# Patient Record
Sex: MALE | Race: WHITE | Employment: UNEMPLOYED | URBAN - METROPOLITAN AREA
[De-identification: names, ages, dates, MRNs, and addresses within clinical notes are randomized per-mention and may not be internally consistent; named-entity substitution may affect disease eponyms.]

---
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
## 2025-06-29 ENCOUNTER — HOSPITAL ENCOUNTER (OUTPATIENT)
Age: 20
Discharge: HOME | End: 2025-06-29
Payer: COMMERCIAL

## 2025-06-29 VITALS
OXYGEN SATURATION: 99 % | SYSTOLIC BLOOD PRESSURE: 146 MMHG | HEART RATE: 89 BPM | DIASTOLIC BLOOD PRESSURE: 80 MMHG | TEMPERATURE: 99 F | RESPIRATION RATE: 22 BRPM

## 2025-06-29 DIAGNOSIS — H60.313 ACUTE DIFFUSE OTITIS EXTERNA OF BOTH EARS: Primary | ICD-10-CM

## 2025-06-29 RX ORDER — ACETIC ACID 20.65 MG/ML
4 SOLUTION AURICULAR (OTIC) 3 TIMES DAILY
Qty: 15 ML | Refills: 0 | Status: SHIPPED | OUTPATIENT
Start: 2025-06-29

## 2025-06-29 RX ORDER — CIPROFLOXACIN AND DEXAMETHASONE 3; 1 MG/ML; MG/ML
4 SUSPENSION/ DROPS AURICULAR (OTIC) 2 TIMES DAILY
Qty: 7.5 ML | Refills: 0 | Status: SHIPPED | OUTPATIENT
Start: 2025-06-29

## 2025-06-29 RX ORDER — IBUPROFEN 600 MG/1
600 TABLET, FILM COATED ORAL ONCE
Status: COMPLETED | OUTPATIENT
Start: 2025-06-29 | End: 2025-06-29

## 2025-06-29 NOTE — ED PROVIDER NOTES
Patient Seen in: [facility]        History  Chief Complaint   Patient presents with    Ear Pain     Stated Complaint: Ear Pain    Subjective:   HPI          .JBEA      Objective:     Past Medical History:    Back pain    Depression    Gender dysphoria in adult              Past Surgical History:   Procedure Laterality Date    Mastectomy Bilateral                 No pertinent social history.            Review of Systems   All other systems reviewed and are negative.      Positive for stated complaint: Ear Pain  Other systems are as noted in HPI.  Constitutional and vital signs reviewed.      All other systems reviewed and negative except as noted above.                  Physical Exam    ED Triage Vitals [06/29/25 0824]   /80   Pulse 89   Resp 22   Temp 98.7 °F (37.1 °C)   Temp src Oral   SpO2 99 %   O2 Device None (Room air)       Current Vitals:   Vital Signs  BP: 146/80  Pulse: 89  Resp: 22  Temp: 98.7 °F (37.1 °C)  Temp src: Oral    Oxygen Therapy  SpO2: 99 %  O2 Device: None (Room air)            Physical Exam  Vitals and nursing note reviewed.   Constitutional:       General: He is not in acute distress.     Appearance: Normal appearance. He is normal weight. He is not ill-appearing, toxic-appearing or diaphoretic.   HENT:      Head: Normocephalic and atraumatic.      Right Ear: Tympanic membrane normal.      Left Ear: Tympanic membrane normal.      Ears:      Comments: Diffuse soft tissue swelling and erythema throughout bilateral ear canals with associated tragal tenderness      Nose: Congestion present. No rhinorrhea.      Mouth/Throat:      Mouth: Mucous membranes are moist.      Pharynx: Oropharynx is clear. No oropharyngeal exudate or posterior oropharyngeal erythema.   Eyes:      Extraocular Movements: Extraocular movements intact.      Conjunctiva/sclera: Conjunctivae normal.      Pupils: Pupils are equal, round, and reactive to light.   Cardiovascular:      Rate and Rhythm: Normal  rate.      Pulses: Normal pulses.   Pulmonary:      Effort: Pulmonary effort is normal. No respiratory distress.      Breath sounds: Normal breath sounds. No stridor. No wheezing, rhonchi or rales.   Chest:      Chest wall: No tenderness.   Musculoskeletal:         General: No swelling, tenderness, deformity or signs of injury. Normal range of motion.      Cervical back: Normal range of motion and neck supple.      Right lower leg: No edema.      Left lower leg: No edema.   Skin:     General: Skin is warm and dry.      Capillary Refill: Capillary refill takes less than 2 seconds.      Coloration: Skin is not jaundiced or pale.      Findings: No bruising, erythema, lesion or rash.   Neurological:      General: No focal deficit present.      Mental Status: He is alert and oriented to person, place, and time. Mental status is at baseline.   Psychiatric:         Mood and Affect: Mood normal.         Behavior: Behavior normal.         Thought Content: Thought content normal.         Judgment: Judgment normal.       Genie Barber is a 20 year old adult presents with bilateral ear pain  x 2 days.   No reported  fevers, rhinorrhea, sinus pressure/ headache, cough, shortness of breath, respiratory distress, chest pain, flank pain, back pain, abdominal pain, nausea, vomiting, diarrhea, eye pain/ redness/ discharge/ visual disturbances, neck pain/ stiffness.  No medications taken prior to arrival.            ED Course  Labs Reviewed - No data to display                         MDM            Medical Decision Making  male presents with bilateral ear pain  x 2 days. Considerations include otitis externa versus mastoiditis vs otitis media versus cerumen impaction   Plan   - labs: NONE  - DC to home   - refer to pcp/ ENT for further evaluation   - return to ED if symptoms worsens  - rx: Ciprofloxacin- dexamethasone ear drops 4 drops to bilateral ears twice daily.  Acetic acid ear drops  5 drops to bilateral ears three times daily.     - OTC;  ibuprofen 600mg po q8 hours/ prn. Tylenol 1g po q 6 hours/ prn.              Disposition and Plan     Clinical Impression:  1. Acute diffuse otitis externa of both ears         Disposition:  Discharge  6/29/2025  8:50 am    Follow-up:  [address] [address]  [phone]              Medications Prescribed:  Current Discharge Medication List        START taking these medications    Details   acetic acid 2 % Otic Solution Place 4 drops into both ears 3 (three) times daily.  Qty: 15 mL, Refills: 0      ciprofloxacin-dexamethasone 0.3-0.1 % Otic Suspension Place 4 drops into both ears 2 (two) times daily.  Qty: 7.5 mL, Refills: 0                   Supplementary Documentation:

## 2025-06-29 NOTE — ED INITIAL ASSESSMENT (HPI)
Pt came in due to left ear pain for the past few days. Pt stated he attempted to clean his ear on his own at home but was not able to. Pt has easy non labored respirations.